# Patient Record
Sex: MALE | Race: WHITE | NOT HISPANIC OR LATINO | Employment: FULL TIME | URBAN - METROPOLITAN AREA
[De-identification: names, ages, dates, MRNs, and addresses within clinical notes are randomized per-mention and may not be internally consistent; named-entity substitution may affect disease eponyms.]

---

## 2017-10-23 ENCOUNTER — HOSPITAL ENCOUNTER (OUTPATIENT)
Dept: RADIOLOGY | Facility: HOSPITAL | Age: 57
Discharge: HOME | End: 2017-10-23
Attending: ORTHOPAEDIC SURGERY
Payer: COMMERCIAL

## 2017-10-23 ENCOUNTER — OFFICE VISIT (OUTPATIENT)
Dept: ORTHOPEDICS | Facility: CLINIC | Age: 57
End: 2017-10-23
Payer: COMMERCIAL

## 2017-10-23 VITALS — BODY MASS INDEX: 40.81 KG/M2 | HEIGHT: 67 IN | WEIGHT: 260 LBS

## 2017-10-23 DIAGNOSIS — M25.561 ACUTE PAIN OF RIGHT KNEE: ICD-10-CM

## 2017-10-23 DIAGNOSIS — M17.11 PRIMARY OSTEOARTHRITIS OF RIGHT KNEE: ICD-10-CM

## 2017-10-23 DIAGNOSIS — M25.561 RIGHT KNEE PAIN, UNSPECIFIED CHRONICITY: Primary | ICD-10-CM

## 2017-10-23 DIAGNOSIS — M25.561 ACUTE PAIN OF RIGHT KNEE: Primary | ICD-10-CM

## 2017-10-23 PROCEDURE — 73560 X-RAY EXAM OF KNEE 1 OR 2: CPT | Mod: TC,PO,LT

## 2017-10-23 PROCEDURE — 73562 X-RAY EXAM OF KNEE 3: CPT | Mod: 26,RT,, | Performed by: RADIOLOGY

## 2017-10-23 PROCEDURE — 99203 OFFICE O/P NEW LOW 30 MIN: CPT | Mod: 25,S$GLB,, | Performed by: ORTHOPAEDIC SURGERY

## 2017-10-23 PROCEDURE — 99999 PR PBB SHADOW E&M-NEW PATIENT-LVL II: CPT | Mod: PBBFAC,,, | Performed by: ORTHOPAEDIC SURGERY

## 2017-10-23 PROCEDURE — 73560 X-RAY EXAM OF KNEE 1 OR 2: CPT | Mod: 26,59,LT, | Performed by: RADIOLOGY

## 2017-10-23 PROCEDURE — 97760 ORTHOTIC MGMT&TRAING 1ST ENC: CPT | Mod: S$GLB,,, | Performed by: ORTHOPAEDIC SURGERY

## 2017-10-23 RX ORDER — ATORVASTATIN CALCIUM 20 MG/1
TABLET, FILM COATED ORAL
COMMUNITY
Start: 2017-10-22 | End: 2022-07-06

## 2017-10-23 RX ORDER — OMEGA-3-ACID ETHYL ESTERS 1 G/1
CAPSULE, LIQUID FILLED ORAL
COMMUNITY
Start: 2017-10-22 | End: 2022-10-11

## 2017-10-23 RX ORDER — AMLODIPINE AND BENAZEPRIL HYDROCHLORIDE 5; 40 MG/1; MG/1
CAPSULE ORAL
COMMUNITY
Start: 2017-10-22 | End: 2022-07-06

## 2017-10-23 RX ORDER — SILDENAFIL CITRATE 100 MG/1
TABLET, FILM COATED ORAL
COMMUNITY
Start: 2017-08-08 | End: 2023-04-18

## 2017-10-23 NOTE — PROGRESS NOTES
Saud Ford, 57 years old, right knee pain for a couple months' time, stepped   wrong when carrying furniture, twisted his knee, has had aching pain since   then.  It is 10/10 on the pain scale.  No formal treatment.  Tried topical as   well with partial relief.    PHYSICAL EXAMINATION:  Today shows he is tender at the medial side of the knee.    He has a positive Leia testing.  No valgus laxity is detected.  Hip range   of motion is painless.  Skin is intact.  Compartments are soft.    X-rays overall show relatively well preserved joint spacing.    ASSESSMENT:  Possible meniscal tear.    PLAN:  We are going to fitted with a brace, home exercise program.  We can check   him back in several weeks' time to see how things are coming along.      PBB/HN  dd: 10/23/2017 15:31:37 (CDT)  td: 10/24/2017 03:19:13 (CDT)  Doc ID   #[id number]  Job ID #[id number]    CC:     Further History  Aching pain  Worse with activity  Relieved with rest  No other associated symptoms  No other radiation    Further Exam  Alert and oriented  Pleasant  Contralateral limb has appropriate range of motion for age and condition  Contralateral limb has appropriate strength for age and condition  Contralateral limb has appropriate stability  for age and condition  No adenopathy  Pulses are appropriate for current condition  Skin is intact        Chief Complaint    Chief Complaint   Patient presents with    Right Knee - Pain       HPI  Saud Ford is a 57 y.o.  male who presents with       Past Medical History  No past medical history on file.    Past Surgical History  No past surgical history on file.    Medications  Current Outpatient Prescriptions   Medication Sig    amlodipine-benazepril (LOTREL) 5-40 mg per capsule     atorvastatin (LIPITOR) 20 MG tablet     LOVAZA 1 gram capsule     VIAGRA 100 mg tablet      No current facility-administered medications for this visit.        Allergies  Review of patient's allergies indicates:  No  Known Allergies    Family History  No family history on file.    Social History  Social History     Social History    Marital status:      Spouse name: N/A    Number of children: N/A    Years of education: N/A     Occupational History    Not on file.     Social History Main Topics    Smoking status: Not on file    Smokeless tobacco: Not on file    Alcohol use Not on file    Drug use: Unknown    Sexual activity: Not on file     Other Topics Concern    Not on file     Social History Narrative    No narrative on file               Review of Systems     Constitutional: Negative    HENT: Negative  Eyes: Negative  Respiratory: Negative  Cardiovascular: Negative  Musculoskeletal: HPI  Skin: Negative  Neurological: Negative  Hematological: Negative  Endocrine: Negative                 Physical Exam    There were no vitals filed for this visit.  Body mass index is 40.72 kg/m².  Physical Examination:     General appearance -  well appearing, and in no distress  Mental status - awake  Neck - supple  Chest -  symmetric air entry  Heart - normal rate   Abdomen - soft      Assessment     1. Right knee pain, unspecified chronicity    2. Primary osteoarthritis of right knee          PlanWe performed a custom orthotic/brace fitting, adjusting and training with the patient. The patient demonstrated understanding and proper care. This was performed for 15 minutes.

## 2020-04-01 ENCOUNTER — HISTORICAL (OUTPATIENT)
Dept: ADMINISTRATIVE | Facility: HOSPITAL | Age: 60
End: 2020-04-01

## 2020-04-01 LAB
ABS NEUT (OLG): 3 X10(3)/MCL (ref 2.1–9.2)
ALBUMIN SERPL-MCNC: 4.3 GM/DL (ref 3.4–5)
ALBUMIN/GLOB SERPL: 1.79 {RATIO} (ref 1.5–2.5)
ALP SERPL-CCNC: 44 UNIT/L (ref 38–126)
ALT SERPL-CCNC: 15 UNIT/L (ref 7–52)
AST SERPL-CCNC: 11 UNIT/L (ref 15–37)
BILIRUB SERPL-MCNC: 0.6 MG/DL (ref 0.2–1)
BILIRUBIN DIRECT+TOT PNL SERPL-MCNC: 0.1 MG/DL (ref 0–0.5)
BILIRUBIN DIRECT+TOT PNL SERPL-MCNC: 0.5 MG/DL
BUN SERPL-MCNC: 18 MG/DL (ref 7–18)
CALCIUM SERPL-MCNC: 8.6 MG/DL (ref 8.5–10)
CHLORIDE SERPL-SCNC: 105 MMOL/L (ref 98–107)
CHOLEST SERPL-MCNC: 167 MG/DL (ref 0–200)
CHOLEST/HDLC SERPL: 3.3 {RATIO}
CO2 SERPL-SCNC: 27 MMOL/L (ref 21–32)
CREAT SERPL-MCNC: 0.99 MG/DL (ref 0.6–1.3)
ERYTHROCYTE [DISTWIDTH] IN BLOOD BY AUTOMATED COUNT: 12.2 % (ref 11.5–17)
EST. AVERAGE GLUCOSE BLD GHB EST-MCNC: 111 MG/DL
GLOBULIN SER-MCNC: 2.4 GM/DL (ref 1.2–3)
GLUCOSE SERPL-MCNC: 117 MG/DL (ref 74–106)
HBA1C MFR BLD: 5.5 % (ref 4.4–6.4)
HCT VFR BLD AUTO: 44.2 % (ref 42–52)
HDLC SERPL-MCNC: 51 MG/DL (ref 35–60)
HGB BLD-MCNC: 14.6 GM/DL (ref 14–18)
LDLC SERPL CALC-MCNC: 92 MG/DL (ref 0–129)
LYMPHOCYTES # BLD AUTO: 1.8 X10(3)/MCL (ref 0.6–3.4)
LYMPHOCYTES NFR BLD AUTO: 34.8 % (ref 13–40)
MCH RBC QN AUTO: 29.3 PG (ref 27–31.2)
MCHC RBC AUTO-ENTMCNC: 33 GM/DL (ref 32–36)
MCV RBC AUTO: 89 FL (ref 80–94)
MONOCYTES # BLD AUTO: 0.4 X10(3)/MCL (ref 0.1–1.3)
MONOCYTES NFR BLD AUTO: 7.3 % (ref 0.1–24)
NEUTROPHILS NFR BLD AUTO: 57.9 % (ref 47–80)
PLATELET # BLD AUTO: 238 X10(3)/MCL (ref 130–400)
PMV BLD AUTO: 9.8 FL (ref 9.4–12.4)
POTASSIUM SERPL-SCNC: 4.5 MMOL/L (ref 3.5–5.1)
PROT SERPL-MCNC: 6.7 GM/DL (ref 6.4–8.2)
RBC # BLD AUTO: 4.98 X10(6)/MCL (ref 4.7–6.1)
SODIUM SERPL-SCNC: 138 MMOL/L (ref 136–145)
TRIGL SERPL-MCNC: 135 MG/DL (ref 30–150)
TSH SERPL-ACNC: 1.49 MIU/ML (ref 0.35–4.94)
VLDLC SERPL CALC-MCNC: 27 MG/DL
WBC # SPEC AUTO: 5.2 X10(3)/MCL (ref 4.5–11.5)

## 2020-06-16 LAB — CRC RECOMMENDATION EXT: NORMAL

## 2020-10-01 ENCOUNTER — HISTORICAL (OUTPATIENT)
Dept: ADMINISTRATIVE | Facility: HOSPITAL | Age: 60
End: 2020-10-01

## 2020-10-01 LAB
ALBUMIN SERPL-MCNC: 4.5 GM/DL (ref 3.4–5)
ALBUMIN/GLOB SERPL: 2.05 {RATIO} (ref 1.5–2.5)
ALP SERPL-CCNC: 52 UNIT/L (ref 38–126)
ALT SERPL-CCNC: 16 UNIT/L (ref 7–52)
AST SERPL-CCNC: 13 UNIT/L (ref 15–37)
BILIRUB SERPL-MCNC: 0.5 MG/DL (ref 0.2–1)
BILIRUBIN DIRECT+TOT PNL SERPL-MCNC: 0.1 MG/DL (ref 0–0.5)
BILIRUBIN DIRECT+TOT PNL SERPL-MCNC: 0.4 MG/DL
BUN SERPL-MCNC: 19 MG/DL (ref 7–18)
CALCIUM SERPL-MCNC: 9.1 MG/DL (ref 8.5–10.1)
CHLORIDE SERPL-SCNC: 106 MMOL/L (ref 98–107)
CHOLEST SERPL-MCNC: 156 MG/DL (ref 0–200)
CHOLEST/HDLC SERPL: 3.1 {RATIO}
CO2 SERPL-SCNC: 26 MMOL/L (ref 21–32)
CREAT SERPL-MCNC: 0.86 MG/DL (ref 0.6–1.3)
GLOBULIN SER-MCNC: 2.2 GM/DL (ref 1.2–3)
GLUCOSE SERPL-MCNC: 105 MG/DL (ref 74–106)
HDLC SERPL-MCNC: 51 MG/DL (ref 35–60)
LDLC SERPL CALC-MCNC: 85 MG/DL (ref 0–129)
POTASSIUM SERPL-SCNC: 4.5 MMOL/L (ref 3.5–5.1)
PROT SERPL-MCNC: 6.7 GM/DL (ref 6.4–8.2)
SODIUM SERPL-SCNC: 139 MMOL/L (ref 136–145)
TRIGL SERPL-MCNC: 116 MG/DL (ref 30–150)
VLDLC SERPL CALC-MCNC: 23.2 MG/DL

## 2021-04-08 ENCOUNTER — HISTORICAL (OUTPATIENT)
Dept: ADMINISTRATIVE | Facility: HOSPITAL | Age: 61
End: 2021-04-08

## 2021-04-08 LAB
ABS NEUT (OLG): 3.6 X10(3)/MCL (ref 2.1–9.2)
ALBUMIN SERPL-MCNC: 4.6 GM/DL (ref 3.4–5)
ALBUMIN/GLOB SERPL: 2 {RATIO} (ref 1.5–2.5)
ALP SERPL-CCNC: 54 UNIT/L (ref 38–126)
ALT SERPL-CCNC: 19 UNIT/L (ref 7–52)
APPEARANCE, UA: CLEAR
AST SERPL-CCNC: 15 UNIT/L (ref 15–37)
BACTERIA #/AREA URNS AUTO: NORMAL /HPF
BILIRUB SERPL-MCNC: 0.6 MG/DL (ref 0.2–1)
BILIRUB UR QL STRIP: NEGATIVE MG/DL
BILIRUBIN DIRECT+TOT PNL SERPL-MCNC: 0.2 MG/DL (ref 0–0.5)
BILIRUBIN DIRECT+TOT PNL SERPL-MCNC: 0.4 MG/DL
BUN SERPL-MCNC: 20 MG/DL (ref 7–18)
CALCIUM SERPL-MCNC: 9.1 MG/DL (ref 8.5–10.1)
CHLORIDE SERPL-SCNC: 104 MMOL/L (ref 98–107)
CHOLEST SERPL-MCNC: 170 MG/DL (ref 0–200)
CHOLEST/HDLC SERPL: 3.1 {RATIO}
CO2 SERPL-SCNC: 26 MMOL/L (ref 21–32)
COLOR UR: YELLOW
CREAT SERPL-MCNC: 0.85 MG/DL (ref 0.6–1.3)
DEPRECATED CALCIDIOL+CALCIFEROL SERPL-MC: 30.8 NG/ML (ref 30–80)
ERYTHROCYTE [DISTWIDTH] IN BLOOD BY AUTOMATED COUNT: 11.9 % (ref 11.5–17)
GLOBULIN SER-MCNC: 2.3 GM/DL (ref 1.2–3)
GLUCOSE (UA): NEGATIVE MG/DL
GLUCOSE SERPL-MCNC: 108 MG/DL (ref 74–106)
HCT VFR BLD AUTO: 42.4 % (ref 42–52)
HDLC SERPL-MCNC: 55 MG/DL (ref 35–60)
HGB BLD-MCNC: 14.4 GM/DL (ref 14–18)
HGB UR QL STRIP: NEGATIVE UNIT/L
KETONES UR QL STRIP: NORMAL MG/DL
LDLC SERPL CALC-MCNC: 97 MG/DL (ref 0–129)
LEUKOCYTE ESTERASE UR QL STRIP: NEGATIVE UNIT/L
LYMPHOCYTES # BLD AUTO: 1.5 X10(3)/MCL (ref 0.6–3.4)
LYMPHOCYTES NFR BLD AUTO: 27.4 % (ref 13–40)
MCH RBC QN AUTO: 29.7 PG (ref 27–31.2)
MCHC RBC AUTO-ENTMCNC: 34 GM/DL (ref 32–36)
MCV RBC AUTO: 87 FL (ref 80–94)
MONOCYTES # BLD AUTO: 0.5 X10(3)/MCL (ref 0.1–1.3)
MONOCYTES NFR BLD AUTO: 8.9 % (ref 0.1–24)
NEUTROPHILS NFR BLD AUTO: 63.7 % (ref 47–80)
NITRITE UR QL STRIP.AUTO: NEGATIVE
PH UR STRIP: 5.5 [PH]
PLATELET # BLD AUTO: 242 X10(3)/MCL (ref 130–400)
PMV BLD AUTO: 8.9 FL (ref 9.4–12.4)
POTASSIUM SERPL-SCNC: 4.4 MMOL/L (ref 3.5–5.1)
PROT SERPL-MCNC: 6.9 GM/DL (ref 6.4–8.2)
PROT UR QL STRIP: NEGATIVE MG/DL
RBC # BLD AUTO: 4.85 X10(6)/MCL (ref 4.7–6.1)
RBC #/AREA URNS HPF: NORMAL /HPF
SODIUM SERPL-SCNC: 139 MMOL/L (ref 136–145)
SP GR UR STRIP: 1.02
SQUAMOUS EPITHELIAL, UA: NORMAL /LPF
TRIGL SERPL-MCNC: 115 MG/DL (ref 30–150)
UROBILINOGEN UR STRIP-ACNC: 0.2 MG/DL
VLDLC SERPL CALC-MCNC: 23 MG/DL
WBC # SPEC AUTO: 5.6 X10(3)/MCL (ref 4.5–11.5)
WBC #/AREA URNS AUTO: NORMAL /[HPF]

## 2021-10-04 ENCOUNTER — HISTORICAL (OUTPATIENT)
Dept: ADMINISTRATIVE | Facility: HOSPITAL | Age: 61
End: 2021-10-04

## 2021-10-04 LAB
ABS NEUT (OLG): 3.9 X10(3)/MCL (ref 2.1–9.2)
ALBUMIN SERPL-MCNC: 4.8 GM/DL (ref 3.4–5)
ALBUMIN/GLOB SERPL: 2.53 {RATIO} (ref 1.5–2.5)
ALP SERPL-CCNC: 60 UNIT/L (ref 38–126)
ALT SERPL-CCNC: 14 UNIT/L (ref 7–52)
AST SERPL-CCNC: 14 UNIT/L (ref 15–37)
BILIRUB SERPL-MCNC: 0.5 MG/DL (ref 0.2–1)
BILIRUBIN DIRECT+TOT PNL SERPL-MCNC: 0.1 MG/DL (ref 0–0.5)
BILIRUBIN DIRECT+TOT PNL SERPL-MCNC: 0.4 MG/DL
BUN SERPL-MCNC: 18 MG/DL (ref 7–18)
CALCIUM SERPL-MCNC: 9.4 MG/DL (ref 8.5–10.1)
CHLORIDE SERPL-SCNC: 104 MMOL/L (ref 98–107)
CHOLEST SERPL-MCNC: 146 MG/DL (ref 0–200)
CHOLEST/HDLC SERPL: 3.4 {RATIO}
CO2 SERPL-SCNC: 26 MMOL/L (ref 21–32)
CREAT SERPL-MCNC: 0.89 MG/DL (ref 0.6–1.3)
DEPRECATED CALCIDIOL+CALCIFEROL SERPL-MC: 46.2 NG/ML (ref 30–80)
ERYTHROCYTE [DISTWIDTH] IN BLOOD BY AUTOMATED COUNT: 12.1 % (ref 11.5–17)
EST CREAT CLEARANCE SER (OHS): 142.27 ML/MIN
EST. AVERAGE GLUCOSE BLD GHB EST-MCNC: 111 MG/DL
GLOBULIN SER-MCNC: 1.9 GM/DL (ref 1.2–3)
GLUCOSE SERPL-MCNC: 111 MG/DL (ref 74–106)
HBA1C MFR BLD: 5.5 % (ref 4.4–6.4)
HCT VFR BLD AUTO: 45.3 % (ref 42–52)
HDLC SERPL-MCNC: 43 MG/DL (ref 35–60)
HGB BLD-MCNC: 15.3 GM/DL (ref 14–18)
LDLC SERPL CALC-MCNC: 69 MG/DL (ref 0–129)
LYMPHOCYTES # BLD AUTO: 1.6 X10(3)/MCL (ref 0.6–3.4)
LYMPHOCYTES NFR BLD AUTO: 26.9 % (ref 13–40)
MCH RBC QN AUTO: 29.4 PG (ref 27–31.2)
MCHC RBC AUTO-ENTMCNC: 34 GM/DL (ref 32–36)
MCV RBC AUTO: 87 FL (ref 80–94)
MONOCYTES # BLD AUTO: 0.6 X10(3)/MCL (ref 0.1–1.3)
MONOCYTES NFR BLD AUTO: 10.1 % (ref 0.1–24)
NEUTROPHILS NFR BLD AUTO: 63 % (ref 47–80)
PLATELET # BLD AUTO: 256 X10(3)/MCL (ref 130–400)
PMV BLD AUTO: 9.6 FL (ref 9.4–12.4)
POTASSIUM SERPL-SCNC: 4.7 MMOL/L (ref 3.5–5.1)
PROT SERPL-MCNC: 6.7 GM/DL (ref 6.4–8.2)
RBC # BLD AUTO: 5.2 X10(6)/MCL (ref 4.7–6.1)
SODIUM SERPL-SCNC: 139 MMOL/L (ref 136–145)
TRIGL SERPL-MCNC: 129 MG/DL (ref 30–150)
VLDLC SERPL CALC-MCNC: 25.8 MG/DL
WBC # SPEC AUTO: 6.1 X10(3)/MCL (ref 4.5–11.5)

## 2022-04-10 ENCOUNTER — HISTORICAL (OUTPATIENT)
Dept: ADMINISTRATIVE | Facility: HOSPITAL | Age: 62
End: 2022-04-10
Payer: COMMERCIAL

## 2022-04-29 VITALS
HEIGHT: 67 IN | DIASTOLIC BLOOD PRESSURE: 80 MMHG | SYSTOLIC BLOOD PRESSURE: 119 MMHG | WEIGHT: 261 LBS | BODY MASS INDEX: 40.97 KG/M2

## 2022-05-02 NOTE — HISTORICAL [facility] CERNER
This is a historical note converted from Rosalia. Formatting and pictures may have been removed.  Please reference Rosalia for original formatting and attached multimedia. Chief Complaint  CPX FASTING  History of Present Illness  Patient is here for ?his?annual wellness -?labs not done, fasting.?Denies any side effects to current medications.? Tolerating current medications and?feels that they are effective.? Denies change in social or family history.?  ?   Social Hx:??  Living in?[place]. Retired from [employer]. No regular exercise. No nicotine. Occas etoh.  , 2 children, Daughter work for?[employer] in [place]. Son in [employer] - [place] soon [place] being [place] of the country. 2 grands from son.  ?  Family Hx:  Father  at 86 in 2018 : Renal Failure, Hypercholesterolemia, DM, HTN, Asthma  Mother 83 : HTN, Hypercholesterolemia, Thyroid, Dementia  2 sisters : 1 is an MD, 1 is a preacher.  ?  Surgical Hx:  Radical prostatectomy 10/2011  Nose 1966  Right knee scope  with Dr. Uribe  ?   Specialists:  Urologist: Pat, last PSA 0.01 in January  GI: Barb, last?colonoscopy?2020, 1 polyp, moderate diverticulosis, tortuous colon, repeat in 7 years  CARD: Dr. Ojeda, Normal cardiac work up?in 2017. Dr. Edgard Valenzuela in [place], angiogram 2017 - good as per patient. Myocardial PET 10/2018. PRN OV.  Calcium Score Test 2016 - total score 145.9  Review of Systems  General:?? Patient reports energy level is ?fair. Denies weight change.??Denies fever,chills, night sweats, or weakness. ?Reports fatigue.  Integument:?? Denies any nevus changes, rashes, urticaria,??or sores.??Also denies itching or areas of numbness.  HEENT:?? Denies vision changes or eye pain.??No sore throat, ear pain, sinus pressure or discharge.  Cardiovascular:?? Denies chest pain, palpitations, dyspnea on exertion, orthopnea.  Respiratory:?? No cough, wheezing, shortness of breath, or sputum.  GI:?? Denies nausea,  emesis, constipation, diarrhea, melena, hematochezia or abdominal pain  :?? No frequency, urgency, hematuria, discharge, or incontinence  MS:?? Denies myalgias, arthralgias, joint effusion, edema, or weakness  Neuro:?? No headaches, numbness in extremities, tingling, dizziness, or weakness  Psych:?? Denies anxiety, depression, suicidal or homicidal ideations, or irritability  Endo:?? Denies polyuria, polydipsia, polyphagia  Heme:?? No abnormal bleeding or bruising. No lymph node enlargement or pain.  ?  Physical Exam  Vitals & Measurements  HR:?70(Peripheral)? BP:?136/69?  HT:?170.00?cm? HT:?170?cm? WT:?120.400?kg? WT:?120.4?kg? BMI:?41.66?  General:?? Well-developed, obese, no apparent distress, alert and oriented??4.  Integument:?? Skin is intact with no erythema.??No pustules or vesicles.??No rash or scale. No Lymphadenopathy.??No urticaria.??No abnormal nevi.  HEENT:?? PERRLA; TMs and EACs clear, normal turbinates with no erythema, normal mucosa, no sinus tenderness; no erythema or exudate of mouth or pharynx.  Neck:?? Supple, no lymphadenopathy, no thyromegaly, no bruits, no jugular venous distention.  Cardiovascular:?? Regular rhythm and rate, no murmurs, radial and dorsal pedal pulses 2+ bilaterally.  Respiratory:?? Lungs clear to auscultation bilaterally, no wheezes, no crackles, no rhonchi.??Good air movement.  Abdomen:?? NABS, soft, nontender, no hepatosplenomegaly, no masses, no guarding or rebound.?  MS:?? No CCE.  Neuro:?? CN II-XII intact_  Psych: Normal affect, patient calm, good historian, patient answers questions?appropriately. Good hygiene.  Heme:? No bruising or petechiae.  ?  Assessment/Plan  1.?Wellness examination?Z00.00  ?Age appropriate wellness topics discussed. He is up to date on his colonoscopy and continues to follow up with his urologist.  Lab today: CBC, CMP, Lipid, UA  Ordered:  1036F Current tobacco non-user, 04/08/21 9:38:00 CDT  1111F- Medication reconciliation completed within  30 days of an inpatient discharge to home, 04/08/21 9:38:00 CDT  1170F Functional Status Assessment, 04/08/21 9:38:00 CDT  3008F Body Mass Index (BMI), documented, 04/08/21 9:38:00 CDT  3075F Most recent systolic blood pressure, 130 to 139 mm Hg, 04/08/21 9:38:00 CDT  3078F Most recent diastolic blood pressure, less than 80 mm Hg, 04/08/21 9:38:00 CDT  4013F Statin therapy prescribed or currently being taken, 04/08/21 9:38:00 CDT  Preventative Health Care Est 40-64 years 05117 PC, Wellness examination, [facility], 04/08/21 9:38:00 CDT  ?  2.?Hypercholesterolemia?E78.00  Lab today: CMP, Lipid  Ordered:  atorvastatin, See Instructions, TAKE 1 TABLET DAILY, # 90 tab(s), 1 Refill(s), Pharmacy: [facility], 170, cm, Height/Length Dosing, 04/08/21 9:05:00 CDT, 120.4, kg, Weight Dosing, 04/08/21 9:05:00 CDT  Clinic Follow up, *Est. 10/12/21 8:30:00 CDT, 6 month FU, Order for future visit, Immunization due, [facility]  ?  3.?Hypertension?I10  Controlled.  Lab today: CMP  Ordered:  amlodipine-benazepril, See Instructions, TAKE 1 CAPSULE DAILY, # 90 cap(s), 1 Refill(s), Pharmacy: [facility], 170, cm, Height/Length Dosing, 04/08/21 9:05:00 CDT, 120.4, kg, Weight Dosing, 04/08/21 9:05:00 CDT  Clinic Follow up, *Est. 10/12/21 8:30:00 CDT, 6 month FU, Order for future visit, Immunization due, [facility]  ?  4.?Immunization due?Z23  ?Education was provided on the vaccines he is due for: Tetanus, Shingles, and COVID. All questions answered. He was encouraged to obtain proper vaccination.  Ordered:  Clinic Follow up, *Est. 10/12/21 8:30:00 CDT, 6 month FU, Order for future visit, Immunization due, [facility]  ?  4.?Morbid obesity?E66.01  ?Weight loss highly encouraged. He is aware of the long term consequences associated with obesity.  Ordered:  Clinic Follow up, *Est. 10/12/21 8:30:00 CDT, 6 month FU, Order for future visit, Immunization due, [facility]  ?  5.?Fatigue?R53.83  Lab?today: Vit  D  Ordered:  Clinic Follow up, *Est. 10/12/21 8:30:00 CDT, 6 month FU, Order for future visit, Immunization due, [facility]  ?  7.?Encounter for antibody response examination?Z01.84  ?Lab today: COVID antibody  ?  Referrals  Clinic Follow up, *Est. 10/12/21 8:30:00 CDT, 6 month FU, Order for future visit, Immunization due, [facility]   Problem List/Past Medical History  Ongoing  Encounter for antibody response examination  Hypercholesterolemia  Hypertension  Immunization due  Morbid obesity  Historical  No qualifying data  Medications  amlodipine-benazepril 5 mg-40 mg oral capsule, See Instructions, 1 refills  atorvastatin 20 mg oral tablet, See Instructions, 1 refills  Fish Oil oral capsule, 1 cap(s), Oral, Daily  Multi Vitamin+  sildenafil 20 mg oral tablet, See Instructions, 1 refills  Allergies  No Known Medication Allergies  Social History  Abuse/Neglect  No, 04/08/2021  No, 10/01/2020  No, 04/08/2020  Alcohol  Current, 1-2 times per week, 03/22/2018  Tobacco  Never (less than 100 in lifetime), No, 04/08/2021  Never (less than 100 in lifetime), No, 10/01/2020  Never (less than 100 in lifetime), No, 04/08/2020  Never (less than 100 in lifetime), N/A, 03/26/2019  Family History  Alcoholism.: Grandfather.  Asthma.: Father.  Breast cancer: Grandmother.  Diabetes mellitus: Father.  Heart attack: Father and Grandfather.  Hyperlipidemia.: Mother and Father.  Hypertension: Mother and Father.  Thyroid disease: Mother.  Immunizations  Vaccine Date Status   influenza virus vaccine, inactivated 10/01/2020 Given   influenza virus vaccine, inactivated 10/13/2019 Recorded   Health Maintenance  Health Maintenance  ???Pending?(in the next year)  ??? ??OverDue  ??? ? ? ?Alcohol Misuse Screening due??01/02/21??and every 1??year(s)  ??? ??Due?  ??? ? ? ?ADL Screening due??04/09/21??and every 1??year(s)  ??? ? ? ?Aspirin Therapy for CVD Prevention due??04/09/21??and every 1??year(s)  ??? ? ? ?Depression Screening  due??04/09/21??Unknown Frequency  ??? ? ? ?Hypertension Maintenance-Medication Prescribed due??04/09/21??and every 1??year(s)  ??? ? ? ?Hypertension Management-Education due??04/09/21??and every 1??year(s)  ??? ? ? ?Tetanus Vaccine due??04/09/21??and every 10??year(s)  ??? ? ? ?Zoster Vaccine due??04/09/21??Unknown Frequency  ??? ??Due In Future?  ??? ? ? ?Hypertension Management-BMP not due until??10/01/21??and every 1??year(s)  ??? ? ? ?Influenza Vaccine not due until??10/01/21??and every 1??day(s)  ??? ? ? ?Obesity Screening not due until??01/01/22??and every 1??year(s)  ??? ? ? ?Blood Pressure Screening not due until??04/08/22??and every 1??year(s)  ??? ? ? ?Body Mass Index Check not due until??04/08/22??and every 1??year(s)  ??? ? ? ?Hypertension Management-Blood Pressure not due until??04/08/22??and every 1??year(s)  ???Satisfied?(in the past 1 year)  ??? ??Satisfied?  ??? ? ? ?Blood Pressure Screening on??04/08/21.??Satisfied by Christi Barbour CMA S.  ??? ? ? ?Body Mass Index Check on??04/08/21.??Satisfied by Christi Barbour CMA S.  ??? ? ? ?Colorectal Screening on??06/16/20.??Satisfied by Rayne Feng  ??? ? ? ?Diabetes Screening on??04/08/21.??Satisfied by Terri Brar  ??? ? ? ?Hypertension Management-BMP on??04/08/21.??Satisfied by Terri Brar  ??? ? ? ?Influenza Vaccine on??10/01/20.??Satisfied by Christi Barbour CMA.  ??? ? ? ?Lipid Screening on??04/08/21.??Satisfied by Terri Brar  ??? ? ? ?Obesity Screening on??04/08/21.??Satisfied by Christi Barbour CMA  ?      Patient condition discussed?in detail with nurse practitioner.??Agree with plan of care?and follow-up.  ?

## 2022-05-02 NOTE — HISTORICAL [facility] CERNER
This is a historical note converted from Rosalia. Formatting and pictures may have been removed.  Please reference Rosalia for original formatting and attached multimedia. Chief Complaint  6 MTH RECHECK  History of Present Illness  Patient presents for a 6-month follow-up visit.  Planning a vacation to [place] in [place]. Son in [employer] now living [place] in [facility], daughter living in [place]. Will have all the grands with them.  He had his 2nd Pfizer COVID vaccine on 5-5-21.  ?  Review of Systems  General:??????????????? Patient reports energy level is good.??Denies fever,chills, night sweats, fatigue or weakness.  HEENT:?????????????????? Denies vision changes or eye pain.? No sore throat, ear pain, sinus pressure or discharge.  Cardiovascular:??? Denies chest pain, palpitations, dyspnea on exertion, orthopnea.  Respiratory:???????? No cough, wheezing, shortness of breath, or sputum.  GI:????????????????????????? Denies nausea, emesis, constipation, diarrhea, melena, hematochezia or abdominal pain  :??????????????????????? No frequency, urgency, hematuria, discharge, or incontinence  MS:?????????????????????? Denies myalgias, arthralgias, joint effusion, edema, or weakness  Neuro:????????????????? No headaches, numbness in extremities, tingling, dizziness, or weakness  Psych:?????????????????? Denies anxiety, depression, suicidal or homicidal ideations, or irritability  Endo:??????????????????? Denies polyuria, polydipsia, polyphagia  Heme:????????????????? No abnormal bleeding or bruising. No lymph node enlargement or pain.  Physical Exam  Vitals & Measurements  HR:?77(Peripheral)? BP:?119/80?  HT:?170.00?cm? HT:?170?cm? WT:?118.400?kg? WT:?118.4?kg? BMI:?40.97?  General :?????Well-developed morbidly obese white male in no apparent distress, alert and oriented ?4  Neck :?????Supple, no lymphadenopathy, no thyromegaly, no bruits, no jugular venous distention  Cardiovascular :?????? Regular rhythm and rate, no murmurs,  radial and dorsal pedal pulses 2+ bilaterally  Respiratory :??????Lungs clear to auscultation bilaterally, no wheezes, no crackles, no rhonchi.? Good air movement  Abdomen :?????? ?NABS, soft, nontender, no hepatosplenomegaly, no masses, no guarding or rebound????????????????????????  MS :??????? No muscle tenderness, joints WNL, FROM, negative SLR, 1+ pretibial edema.  Neuro :? ?Grossly intact  Heme :?????? No bruising or petechiae?  Back:??????? no CVAT  Assessment/Plan  1.?Hypertension?I10  ?Blood pressure is well controlled.  2.?Peripheral edema?R60.9  ?Encouraged to increase exercise and to lose weight. ?He declines offer for a diuretic at this time.  3.?Hypercholesterolemia?E78.00  ?Check a CMP and lipids  4.?Vitamin D deficiency?E55.9  ?We will check a vitamin D level  5.?Elevated glucose?R73.09  ?Check an A1c  6.?Morbid obesity?E66.01  ?Long-term adverse consequences of obesity discussed with patient at length.  7.?Immunization due?Z23  ?Flu vaccine administered IM.  Referrals  Clinic Follow up, *Est. 04/11/22 3:00:00 CDT, CPX in 6 months, CPX in 6 months, Order for future visit, Hypertension  Peripheral edema  Hypercholesterolemia  Vitamin D deficiency  Elevated glucose  Morbid obesity, [facility]  Clinic Follow up, Order for future visit   Problem List/Past Medical History  Ongoing  Encounter for antibody response examination  Hypercholesterolemia  Hypertension  Immunization due  Morbid obesity  Vitamin D deficiency  Historical  No qualifying data  Medications  amlodipine-benazepril 5 mg-40 mg oral capsule, See Instructions  atorvastatin 20 mg oral tablet, See Instructions  Fish Oil oral capsule, 1 cap(s), Oral, Daily  Multi Vitamin+  sildenafil 20 mg oral tablet, See Instructions, 1 refills  Vitamin D3 5000 intl units (125 mcg) oral capsule, 125 mcg= 1 cap(s), Oral, Daily  Allergies  No Known Medication Allergies  Social History  Abuse/Neglect  No, 10/04/2021  No, 04/08/2021  No, 10/01/2020  No,  04/08/2020  Alcohol  Current, 1-2 times per week, 03/22/2018  Tobacco  Never (less than 100 in lifetime), No, 10/04/2021  Never (less than 100 in lifetime), No, 04/08/2021  Never (less than 100 in lifetime), No, 10/01/2020  Never (less than 100 in lifetime), No, 04/08/2020  Never (less than 100 in lifetime), N/A, 03/26/2019  Family History  Alcoholism.: Grandfather.  Asthma.: Father.  Breast cancer: Grandmother.  Diabetes mellitus: Father.  Heart attack: Father and Grandfather.  Hyperlipidemia.: Mother and Father.  Hypertension: Mother and Father.  Thyroid disease: Mother.  Immunizations  Vaccine Date Status   influenza virus vaccine, inactivated 10/04/2021 Given   COVID-19 MRNA, LNP-S, PF- Pfizer 05/05/2021 Recorded   COVID-19 MRNA, LNP-S, PF- Pfizer 04/14/2021 Recorded   influenza virus vaccine, inactivated 10/01/2020 Given   influenza virus vaccine, inactivated 10/13/2019 Recorded   Health Maintenance  Health Maintenance  ???Pending?(in the next year)  ??? ??OverDue  ??? ? ? ?Alcohol Misuse Screening due??01/02/21??and every 1??year(s)  ??? ??Due?  ??? ? ? ?ADL Screening due??10/17/21??and every 1??year(s)  ??? ? ? ?Aspirin Therapy for CVD Prevention due??10/17/21??and every 1??year(s)  ??? ? ? ?Depression Screening due??10/17/21??Unknown Frequency  ??? ? ? ?Hypertension Maintenance-Medication Prescribed due??10/17/21??and every 1??year(s)  ??? ? ? ?Hypertension Management-Education due??10/17/21??and every 1??year(s)  ??? ? ? ?Tetanus Vaccine due??10/17/21??and every 10??year(s)  ??? ? ? ?Zoster Vaccine due??10/17/21??Unknown Frequency  ??? ??Due In Future?  ??? ? ? ?Obesity Screening not due until??01/01/22??and every 1??year(s)  ??? ? ? ?Blood Pressure Screening not due until??10/04/22??and every 1??year(s)  ??? ? ? ?Body Mass Index Check not due until??10/04/22??and every 1??year(s)  ??? ? ? ?Hypertension Management-Blood Pressure not due until??10/04/22??and every 1??year(s)  ??? ? ? ?Hypertension  Management-BMP not due until??10/04/22??and every 1??year(s)  ???Satisfied?(in the past 1 year)  ??? ??Satisfied?  ??? ? ? ?Blood Pressure Screening on??10/04/21.??Satisfied by Christi Barbour CMA.  ??? ? ? ?Body Mass Index Check on??10/04/21.??Satisfied by Christi Barbour CMA  ??? ? ? ?Diabetes Screening on??10/04/21.??Satisfied by Lavelle Carter  ??? ? ? ?Hypertension Management-BMP on??10/04/21.??Satisfied by Lavelle Carter  ??? ? ? ?Influenza Vaccine on??10/04/21.??Satisfied by Christi Barbour CMA  ??? ? ? ?Lipid Screening on??10/04/21.??Satisfied by Lavelle Carter  ??? ? ? ?Obesity Screening on??10/04/21.??Satisfied by Christi Barbour CMA  ?

## 2022-10-11 PROBLEM — E78.00 HYPERCHOLESTEROLEMIA: Status: ACTIVE | Noted: 2022-10-11

## 2022-10-11 PROBLEM — R73.09 ELEVATED GLUCOSE: Status: ACTIVE | Noted: 2022-10-11

## 2022-10-11 PROBLEM — E55.9 VITAMIN D DEFICIENCY: Status: ACTIVE | Noted: 2022-10-11

## 2022-10-11 PROBLEM — I10 HYPERTENSION: Status: ACTIVE | Noted: 2022-10-11

## 2022-11-03 ENCOUNTER — DOCUMENTATION ONLY (OUTPATIENT)
Dept: PRIMARY CARE CLINIC | Facility: CLINIC | Age: 62
End: 2022-11-03
Payer: COMMERCIAL

## 2023-04-18 PROBLEM — Z85.46 HISTORY OF PROSTATE CANCER: Status: ACTIVE | Noted: 2023-04-18

## 2023-04-18 PROBLEM — E66.01 CLASS 3 SEVERE OBESITY WITHOUT SERIOUS COMORBIDITY WITH BODY MASS INDEX (BMI) OF 40.0 TO 44.9 IN ADULT: Status: ACTIVE | Noted: 2023-04-18

## 2023-04-18 PROBLEM — R73.09 ELEVATED GLUCOSE: Status: RESOLVED | Noted: 2022-10-11 | Resolved: 2023-04-18

## 2023-04-18 PROBLEM — R73.03 PREDIABETES: Status: ACTIVE | Noted: 2023-04-18

## 2023-09-07 ENCOUNTER — PATIENT MESSAGE (OUTPATIENT)
Dept: RESEARCH | Facility: HOSPITAL | Age: 63
End: 2023-09-07
Payer: COMMERCIAL